# Patient Record
Sex: MALE | Race: WHITE | NOT HISPANIC OR LATINO | Employment: FULL TIME | URBAN - NONMETROPOLITAN AREA
[De-identification: names, ages, dates, MRNs, and addresses within clinical notes are randomized per-mention and may not be internally consistent; named-entity substitution may affect disease eponyms.]

---

## 2023-03-16 ENCOUNTER — OFFICE VISIT (OUTPATIENT)
Dept: URGENT CARE | Facility: CLINIC | Age: 53
End: 2023-03-16

## 2023-03-16 VITALS
TEMPERATURE: 98.3 F | BODY MASS INDEX: 32.5 KG/M2 | OXYGEN SATURATION: 96 % | RESPIRATION RATE: 18 BRPM | SYSTOLIC BLOOD PRESSURE: 167 MMHG | WEIGHT: 227 LBS | DIASTOLIC BLOOD PRESSURE: 94 MMHG | HEART RATE: 68 BPM | HEIGHT: 70 IN

## 2023-03-16 DIAGNOSIS — H00.012 HORDEOLUM EXTERNUM OF RIGHT LOWER EYELID: Primary | ICD-10-CM

## 2023-03-16 PROBLEM — E78.5 DYSLIPIDEMIA: Status: ACTIVE | Noted: 2022-10-28

## 2023-03-16 PROBLEM — I10 HTN, GOAL BELOW 130/80: Status: ACTIVE | Noted: 2022-10-28

## 2023-03-16 PROBLEM — R73.03 PREDIABETES: Status: ACTIVE | Noted: 2018-02-13

## 2023-03-16 RX ORDER — ERYTHROMYCIN 5 MG/G
0.5 OINTMENT OPHTHALMIC EVERY 8 HOURS SCHEDULED
Qty: 3.5 G | Refills: 0 | Status: SHIPPED | OUTPATIENT
Start: 2023-03-16 | End: 2023-03-23

## 2023-03-16 RX ORDER — ATORVASTATIN CALCIUM 80 MG/1
TABLET, FILM COATED ORAL
COMMUNITY
Start: 2023-02-20

## 2023-03-16 RX ORDER — FELODIPINE 10 MG/1
TABLET, EXTENDED RELEASE ORAL
COMMUNITY
Start: 2023-03-02

## 2023-03-16 NOTE — PATIENT INSTRUCTIONS
Stye   WHAT YOU NEED TO KNOW:   A stye is a lump on the edge or inside of your upper or lower eyelid caused by an infection  A stye usually starts to get better within 1 week and is often gone within 2 weeks  DISCHARGE INSTRUCTIONS:   Call your doctor if:   You have redness and discharge around your eye, and your eye pain is getting worse  Your vision changes  The stye has not gone away within 2 weeks  The stye comes back within a short period of time after treatment  You have questions or concerns about your condition or care  Medicines:   Antibiotic medicine  is given as an ointment to put into your eye  It is used to fight an infection caused by bacteria  Use as directed  Take your medicine as directed  Contact your healthcare provider if you think your medicine is not helping or if you have side effects  Tell your provider if you are allergic to any medicine  Keep a list of the medicines, vitamins, and herbs you take  Include the amounts, and when and why you take them  Bring the list or the pill bottles to follow-up visits  Carry your medicine list with you in case of an emergency  Manage a stye:   Use warm compresses, as directed  This will help decrease swelling and pain  Wet a clean washcloth with warm water and place it on your eye for 10 to 15 minutes, 3 to 4 times each day, or as directed  Keep your hands away from your eye  This helps to prevent the spread of infection to other parts of the eye  Wash your hands often with soap and dry with a clean towel  Do not squeeze the stye  Do not wear eye makeup while you have a stye  Eye makeup may carry bacteria and cause another stye  Throw away eye makeup and brushes used to apply the makeup  Use new eye makeup after the stye has gone away  Do not share eye makeup with others  Prevent another stye:  Wash your face and clean your eyelashes every day  Remove eye makeup with makeup remover   This helps to remove eye makeup without heavy rubbing  Follow up with your doctor as directed:  Write down your questions so you remember to ask them during your visits  © Copyright [employer] 2022 Information is for End User's use only and may not be sold, redistributed or otherwise used for commercial purposes  The above information is an  only  It is not intended as medical advice for individual conditions or treatments  Talk to your doctor, nurse or pharmacist before following any medical regimen to see if it is safe and effective for you

## 2023-03-16 NOTE — PROGRESS NOTES
[facility]        NAME: Calderon Mcnamara is a 45 y o  male  : 1970    MRN: [id number]  DATE: 2023  TIME: 4:50 PM    Assessment and Plan   Hordeolum externum of right lower eyelid [H00 012]  1  Hordeolum externum of right lower eyelid  erythromycin (ILOTYCIN) ophthalmic ointment            Patient Instructions   Patient Instructions     Stye   WHAT YOU NEED TO KNOW:   A stye is a lump on the edge or inside of your upper or lower eyelid caused by an infection  A stye usually starts to get better within 1 week and is often gone within 2 weeks  DISCHARGE INSTRUCTIONS:   Call your doctor if:   • You have redness and discharge around your eye, and your eye pain is getting worse  • Your vision changes  • The stye has not gone away within 2 weeks  • The stye comes back within a short period of time after treatment  • You have questions or concerns about your condition or care  Medicines:   • Antibiotic medicine  is given as an ointment to put into your eye  It is used to fight an infection caused by bacteria  Use as directed  • Take your medicine as directed  Contact your healthcare provider if you think your medicine is not helping or if you have side effects  Tell your provider if you are allergic to any medicine  Keep a list of the medicines, vitamins, and herbs you take  Include the amounts, and when and why you take them  Bring the list or the pill bottles to follow-up visits  Carry your medicine list with you in case of an emergency  Manage a stye:   • Use warm compresses, as directed  This will help decrease swelling and pain  Wet a clean washcloth with warm water and place it on your eye for 10 to 15 minutes, 3 to 4 times each day, or as directed  • Keep your hands away from your eye  This helps to prevent the spread of infection to other parts of the eye  Wash your hands often with soap and dry with a clean towel  Do not squeeze the stye      • Do not wear eye makeup while you have a stye  Eye makeup may carry bacteria and cause another stye  Throw away eye makeup and brushes used to apply the makeup  Use new eye makeup after the stye has gone away  Do not share eye makeup with others  Prevent another stye:  Wash your face and clean your eyelashes every day  Remove eye makeup with makeup remover  This helps to remove eye makeup without heavy rubbing  Follow up with your doctor as directed:  Write down your questions so you remember to ask them during your visits  © Copyright Rachana Hadley 2022 Information is for End User's use only and may not be sold, redistributed or otherwise used for commercial purposes  The above information is an  only  It is not intended as medical advice for individual conditions or treatments  Talk to your doctor, nurse or pharmacist before following any medical regimen to see if it is safe and effective for you  Follow up with PCP in 3-5 days  Proceed to  ER if symptoms worsen  Chief Complaint     Chief Complaint   Patient presents with   • Eye Problem     Right eye         History of Present Illness       The patient presents to the clinic complaining of pain and swelling of the right lower eye for the past 24 hours  He states he started with a runny nose since Tuesday  He denies associated blurry vision, fevers, or chills  Review of Systems   Review of Systems   Constitutional: Negative for activity change  HENT: Positive for rhinorrhea  Negative for congestion, dental problem, sinus pressure, sore throat, tinnitus, trouble swallowing and voice change  Runny noses since Tuesday   Eyes: Negative for photophobia, pain, discharge, redness, itching and visual disturbance  Respiratory: Negative for apnea            Current Medications       Current Outpatient Medications:   •  erythromycin (ILOTYCIN) ophthalmic ointment, Administer 0 5 inches to the right eye every 8 (eight) hours for 7 days, Disp: 3 5 g, Rfl: 0  •  atorvastatin (LIPITOR) 80 mg tablet, , Disp: , Rfl:   •  felodipine (PLENDIL) 10 MG 24 hr tablet, , Disp: , Rfl:     Current Allergies     Allergies as of 03/16/2023   • (No Known Allergies)            The following portions of the patient's history were reviewed and updated as appropriate: allergies, current medications, past family history, past medical history, past social history, past surgical history and problem list      Past Medical History:   Diagnosis Date   • Hyperlipidemia    • Hypertension        History reviewed  No pertinent surgical history  History reviewed  No pertinent family history  Medications have been verified  Objective   /94   Pulse 68   Temp 98 3 °F (36 8 °C)   Resp 18   Ht 5' 10" (1 778 m)   Wt 103 kg (227 lb)   SpO2 96%   BMI 32 57 kg/m²        Physical Exam     Physical Exam  Constitutional:       Appearance: He is well-developed  HENT:      Head: Normocephalic  Right Ear: Tympanic membrane normal       Left Ear: Tympanic membrane normal       Nose: Rhinorrhea present  Mouth/Throat:      Pharynx: No oropharyngeal exudate or posterior oropharyngeal erythema  Eyes:      General: No allergic shiner, visual field deficit or scleral icterus  Right eye: No foreign body or discharge  Left eye: No discharge  Extraocular Movements:      Right eye: Normal extraocular motion  Conjunctiva/sclera:      Right eye: Right conjunctiva is injected  No exudate or hemorrhage  Pupils: Pupils are equal, round, and reactive to light  Right eye: Pupil is round and not sluggish  No corneal abrasion or fluorescein uptake  Vasu exam negative  Funduscopic exam:     Right eye: No hemorrhage, AV nicking, arteriolar narrowing or papilledema  Red reflex and venous pulsations present  Comments: There is an external hordeolum noted on the lower lid  There is surrounding erythema and warmth     Neck: Thyroid: No thyromegaly  Trachea: No tracheal deviation  Cardiovascular:      Rate and Rhythm: Normal rate and regular rhythm  Heart sounds: No murmur heard  Pulmonary:      Effort: Pulmonary effort is normal  No respiratory distress  Breath sounds: No wheezing or rales  Chest:      Chest wall: No tenderness  Abdominal:      General: Bowel sounds are normal  There is no distension  Palpations: Abdomen is soft  There is no mass  Tenderness: There is no abdominal tenderness  There is no guarding or rebound  Musculoskeletal:         General: Normal range of motion  Cervical back: Normal range of motion  Skin:     General: Skin is warm  Neurological:      Mental Status: He is alert

## 2024-04-10 ENCOUNTER — OFFICE VISIT (OUTPATIENT)
Dept: URGENT CARE | Facility: CLINIC | Age: 54
End: 2024-04-10
Payer: COMMERCIAL

## 2024-04-10 VITALS
RESPIRATION RATE: 20 BRPM | BODY MASS INDEX: 31.54 KG/M2 | WEIGHT: 219.8 LBS | DIASTOLIC BLOOD PRESSURE: 97 MMHG | TEMPERATURE: 98.2 F | HEART RATE: 75 BPM | OXYGEN SATURATION: 97 % | SYSTOLIC BLOOD PRESSURE: 147 MMHG

## 2024-04-10 DIAGNOSIS — L24.9 IRRITANT CONTACT DERMATITIS, UNSPECIFIED TRIGGER: Primary | ICD-10-CM

## 2024-04-10 PROCEDURE — S9083 URGENT CARE CENTER GLOBAL: HCPCS

## 2024-04-10 PROCEDURE — G0382 LEV 3 HOSP TYPE B ED VISIT: HCPCS

## 2024-04-10 RX ORDER — PREDNISONE 20 MG/1
40 TABLET ORAL DAILY
Qty: 10 TABLET | Refills: 0 | Status: SHIPPED | OUTPATIENT
Start: 2024-04-10 | End: 2024-04-15

## 2024-04-10 NOTE — PROGRESS NOTES
[facility]        NAME: Ricky Mckoy is a 54 y.o. male  : 1970    MRN: [id number]  DATE: April 10, 2024  TIME: 4:20 PM    Assessment and Plan   Irritant contact dermatitis, unspecified trigger [L24.9]  1. Irritant contact dermatitis, unspecified trigger  predniSONE 20 mg tablet        Recommend stopping shampoo  Will treat dermatitis with steroid.  Recommend baby shampoo until healed  Mild temperature water    Patient Instructions   Take the steroid as directed  Stop ketaconazole shampoo.    Follow up with PCP in 3-5 days.  Proceed to  ER if symptoms worsen.    Chief Complaint     Chief Complaint   Patient presents with    Rash     Neck, eyes and face.  For a few days.  Switched to Nizoral.           History of Present Illness       Patient is a 54 year old male who presents to the office today for a rash to the back of the neck and and eyes, around the ear. Started a new shampoo on Monday. Rash started afterwards. Noone else has the rash.     Rash        Review of Systems   Review of Systems   Skin:  Positive for rash.         Current Medications       Current Outpatient Medications:     atorvastatin (LIPITOR) 80 mg tablet, , Disp: , Rfl:     felodipine (PLENDIL) 10 MG 24 hr tablet, , Disp: , Rfl:     predniSONE 20 mg tablet, Take 2 tablets (40 mg total) by mouth daily for 5 days, Disp: 10 tablet, Rfl: 0    Current Allergies     Allergies as of 04/10/2024 - Reviewed 04/10/2024   Allergen Reaction Noted    Penicillins Rash 04/10/2024            The following portions of the patient's history were reviewed and updated as appropriate: allergies, current medications, past family history, past medical history, past social history, past surgical history and problem list.     Past Medical History:   Diagnosis Date    Hyperlipidemia     Hypertension        History reviewed. No pertinent surgical history.    History reviewed. No pertinent family history.      Medications have been  verified.        Objective   /97   Pulse 75   Temp 98.2 °F (36.8 °C)   Resp 20   Wt 99.7 kg (219 lb 12.8 oz)   SpO2 97%   BMI 31.54 kg/m²        Physical Exam     Physical Exam  Vitals and nursing note reviewed.   Constitutional:       Appearance: Normal appearance. He is normal weight.   Cardiovascular:      Rate and Rhythm: Normal rate and regular rhythm.      Pulses: Normal pulses.   Pulmonary:      Effort: Pulmonary effort is normal.   Skin:     General: Skin is warm.      Capillary Refill: Capillary refill takes less than 2 seconds.      Findings: Rash present.      Comments: Raised red rash on neck, around ears, around right eye and on left posterior axilla   Neurological:      Mental Status: He is alert.

## 2024-09-03 ENCOUNTER — OFFICE VISIT (OUTPATIENT)
Dept: URGENT CARE | Facility: CLINIC | Age: 54
End: 2024-09-03
Payer: COMMERCIAL

## 2024-09-03 VITALS
HEIGHT: 70 IN | TEMPERATURE: 98.2 F | DIASTOLIC BLOOD PRESSURE: 78 MMHG | BODY MASS INDEX: 30.35 KG/M2 | OXYGEN SATURATION: 98 % | SYSTOLIC BLOOD PRESSURE: 120 MMHG | HEART RATE: 85 BPM | WEIGHT: 212 LBS

## 2024-09-03 DIAGNOSIS — L02.211 ABDOMINAL WALL ABSCESS: Primary | ICD-10-CM

## 2024-09-03 PROCEDURE — S9083 URGENT CARE CENTER GLOBAL: HCPCS

## 2024-09-03 PROCEDURE — G0382 LEV 3 HOSP TYPE B ED VISIT: HCPCS

## 2024-09-03 RX ORDER — SULFAMETHOXAZOLE/TRIMETHOPRIM 800-160 MG
1 TABLET ORAL EVERY 12 HOURS SCHEDULED
Qty: 14 TABLET | Refills: 0 | Status: SHIPPED | OUTPATIENT
Start: 2024-09-03 | End: 2024-09-10

## 2024-09-03 NOTE — PROGRESS NOTES
[facility]        NAME: Ricky Mckoy is a 54 y.o. male  : 1970    MRN: [id number]  DATE: September 3, 2024  TIME: 5:16 PM    Assessment and Plan   Abdominal wall abscess [L02.211]  1. Abdominal wall abscess  sulfamethoxazole-trimethoprim (BACTRIM DS) 800-160 mg per tablet        Will cover with bactrim given allergy to PCN    Patient Instructions     You have been prescribed an antibiotic.  Take antibiotic as directed for the full duration.  Do not stop the antibiotics just because you are feeling better.  Side effects of antibiotics include diarrhea.  Eat yogurt or take a probiotic or acidophilus tablet while taking this medication to help prevent diarrhea and replenish good gut bacteria.  Continue warm compresses.  ED if fever develops  Follow up with PCP in 3-5 days.  Proceed to  ER if symptoms worsen.    Chief Complaint     Chief Complaint   Patient presents with    Abscess     Abdominal, 2 days         History of Present Illness       Patient is a 54 year old male who presents to the office today for a lump on his abdomen that started  upon awakening. Has tried warm compress, neosporin, cortisone. Denies hiking. Was sitting around on Saturday. Denies hx of MRSA.     Abscess        Review of Systems   Review of Systems   Skin:  Positive for color change and wound.   All other systems reviewed and are negative.        Current Medications       Current Outpatient Medications:     atorvastatin (LIPITOR) 80 mg tablet, , Disp: , Rfl:     felodipine (PLENDIL) 10 MG 24 hr tablet, , Disp: , Rfl:     omeprazole (PriLOSEC) 20 mg delayed release capsule, Take 20 mg by mouth daily, Disp: , Rfl:     sulfamethoxazole-trimethoprim (BACTRIM DS) 800-160 mg per tablet, Take 1 tablet by mouth every 12 (twelve) hours for 7 days, Disp: 14 tablet, Rfl: 0    Current Allergies     Allergies as of 2024 - Reviewed 2024   Allergen Reaction Noted    Penicillins Rash 04/10/2024            The  "following portions of the patient's history were reviewed and updated as appropriate: allergies, current medications, past family history, past medical history, past social history, past surgical history and problem list.     Past Medical History:   Diagnosis Date    GERD (gastroesophageal reflux disease)     Hyperlipidemia     Hypertension        History reviewed. No pertinent surgical history.    Family History   Problem Relation Age of Onset    No Known Problems Mother     No Known Problems Father          Medications have been verified.        Objective   /78   Pulse 85   Temp 98.2 °F (36.8 °C)   Ht 5' 10.47\" (1.79 m)   Wt 96.2 kg (212 lb)   SpO2 98%   BMI 30.01 kg/m²        Physical Exam     Physical Exam  Vitals and nursing note reviewed.   Constitutional:       Appearance: Normal appearance. He is normal weight.   Cardiovascular:      Rate and Rhythm: Normal rate.      Pulses: Normal pulses.   Pulmonary:      Effort: Pulmonary effort is normal.   Abdominal:      Palpations: There is mass.      Tenderness: There is no abdominal tenderness. There is no guarding or rebound.      Hernia: A hernia is present.       Neurological:      Mental Status: He is alert.                   "

## 2024-09-03 NOTE — PATIENT INSTRUCTIONS
You have been prescribed an antibiotic.  Take antibiotic as directed for the full duration.  Do not stop the antibiotics just because you are feeling better.  Side effects of antibiotics include diarrhea.  Eat yogurt or take a probiotic or acidophilus tablet while taking this medication to help prevent diarrhea and replenish good gut bacteria.  Continue warm compresses.  ED if fever develops